# Patient Record
Sex: FEMALE | Race: WHITE | HISPANIC OR LATINO
[De-identification: names, ages, dates, MRNs, and addresses within clinical notes are randomized per-mention and may not be internally consistent; named-entity substitution may affect disease eponyms.]

---

## 2018-06-18 ENCOUNTER — TRANSCRIPTION ENCOUNTER (OUTPATIENT)
Age: 38
End: 2018-06-18

## 2022-04-18 ENCOUNTER — FORM ENCOUNTER (OUTPATIENT)
Age: 42
End: 2022-04-18

## 2022-05-18 ENCOUNTER — FORM ENCOUNTER (OUTPATIENT)
Age: 42
End: 2022-05-18

## 2022-06-08 ENCOUNTER — APPOINTMENT (OUTPATIENT)
Dept: ORTHOPEDIC SURGERY | Facility: CLINIC | Age: 42
End: 2022-06-08
Payer: OTHER MISCELLANEOUS

## 2022-06-08 VITALS — HEIGHT: 64 IN | BODY MASS INDEX: 23.9 KG/M2 | WEIGHT: 140 LBS

## 2022-06-08 PROCEDURE — 99072 ADDL SUPL MATRL&STAF TM PHE: CPT

## 2022-06-08 PROCEDURE — 20552 NJX 1/MLT TRIGGER POINT 1/2: CPT

## 2022-06-08 PROCEDURE — J3490M: CUSTOM

## 2022-06-08 PROCEDURE — 99203 OFFICE O/P NEW LOW 30 MIN: CPT | Mod: 25

## 2022-06-11 NOTE — HISTORY OF PRESENT ILLNESS
[Work related] : work related [Gradual] : gradual [Sudden] : sudden [6] : 6 [Dull/Aching] : dull/aching [Radiating] : radiating [Tightness] : tightness [Constant] : constant [Sleep] : sleep [Meds] : meds [Ice] : ice [Full time] : Work status: full time [de-identified] : Patient presents for initial encounter with neck pain. Patient was injured in 2008. Patient has had previous medical management with Dr. Chen and Dr. Borges. ACDF at C5-6. Patient states she is currently changes doctor for medical management. Patient is requesting to have prescriptions for Cymbalta, oxycodone. Patient states her worker's compensation case is closed and completed treatment with formal physical therapy. Patient continues with at home exercises/stretches as best tolerated. No changes in upper extremity strength. \par  Patient is also requesting trigger point injection which she usually receives every 3 months.\par \par Today in office as part of ongoing treatment a trigger point injection is administered into the right trapezius and paracervical muscles to facilitate ROM and stretching. Injection consisted of 1cc Kenalog 40 and 4cc .25% Marcaine. 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

## 2022-06-11 NOTE — DISCUSSION/SUMMARY
[Medication Risks Reviewed] : Medication risks reviewed [de-identified] : Discussed proper body mechanics and modified physical activity to avoid aggravation of symptoms. Patient will continue with at home exercises/stretches as best tolerated. Patient given referral for pain management. Advised patient of habit forming potential with analgesic drug use. Discussed treatment with trigger point injection. Patient given trigger point injection in office. Patient will call to have prescription medications renewed.

## 2022-06-11 NOTE — PHYSICAL EXAM
[Normal Coordination] : normal coordination [Normal DTR UE/LE] : normal DTR UE/LE  [Normal Sensation] : normal sensation [Normal Mood and Affect] : normal mood and affect [Orientated] : orientated [Able to Communicate] : able to communicate [Normal Skin] : normal skin [No Rash] : no rash [No Ulcers] : no ulcers [No Lesions] : no lesions [No obvious lymphadenopathy in areas examined] : no obvious lymphadenopathy in areas examined [Well Developed] : well developed [Well Nourished] : well nourished [Peripheral vascular exam is grossly normal] : peripheral vascular exam is grossly normal [No Respiratory Distress] : no respiratory distress [Lungs clear to auscultation bilaterally] : lungs clear to auscultation bilaterally [NL (45)] : right lateral flexion 45 degrees [NL (80)] : right lateral rotation 80 degrees [5___] : right grasp 5[unfilled]/5 [Biceps 2+] : biceps 2+ [Triceps 2+] : triceps 2+ [Brachioradialis 2+] : brachioradialis 2+ [] : no rhomboid tenderness

## 2022-07-12 ENCOUNTER — FORM ENCOUNTER (OUTPATIENT)
Age: 42
End: 2022-07-12

## 2022-08-11 ENCOUNTER — FORM ENCOUNTER (OUTPATIENT)
Age: 42
End: 2022-08-11

## 2022-08-31 ENCOUNTER — APPOINTMENT (OUTPATIENT)
Dept: ORTHOPEDIC SURGERY | Facility: CLINIC | Age: 42
End: 2022-08-31

## 2022-09-02 ENCOUNTER — NON-APPOINTMENT (OUTPATIENT)
Age: 42
End: 2022-09-02

## 2022-09-10 ENCOUNTER — APPOINTMENT (OUTPATIENT)
Dept: ORTHOPEDIC SURGERY | Facility: CLINIC | Age: 42
End: 2022-09-10

## 2022-09-10 VITALS — WEIGHT: 140 LBS | BODY MASS INDEX: 23.9 KG/M2 | HEIGHT: 64 IN

## 2022-09-10 PROCEDURE — 99072 ADDL SUPL MATRL&STAF TM PHE: CPT

## 2022-09-10 PROCEDURE — 99213 OFFICE O/P EST LOW 20 MIN: CPT | Mod: 25

## 2022-09-10 PROCEDURE — 20552 NJX 1/MLT TRIGGER POINT 1/2: CPT

## 2022-09-10 NOTE — DISCUSSION/SUMMARY
[de-identified] : Discussed medical mgmt, renewed prescriptions for Alprazolam, Duloxetine and oxycodone. Discussed home exercise program.\par Today in office as part of ongoing treatment a trigger point injection is administered into the left Rhomboid and Levator scapula muscles to facilitate ROM and stretching. Injection consisted of 4cc .25% Marcaine and 1cc Kenalog 40.

## 2022-09-10 NOTE — HISTORY OF PRESENT ILLNESS
[5] : 5 [Sharp] : sharp [Full time] : Work status: full time [de-identified] : Patient seen in follow up reports persistent pain in neck and upper extremities. Pain aggravated with increased physical activity. Pain relieved with medications which she has been taking long term. Patient employed in [employer] office. [de-identified] : N/A

## 2022-09-10 NOTE — PHYSICAL EXAM
[Normal Coordination] : normal coordination [Normal DTR UE/LE] : normal DTR UE/LE  [Normal Sensation] : normal sensation [Normal Mood and Affect] : normal mood and affect [Orientated] : orientated [Able to Communicate] : able to communicate [Normal Skin] : normal skin [No Rash] : no rash [No Ulcers] : no ulcers [No Lesions] : no lesions [No obvious lymphadenopathy in areas examined] : no obvious lymphadenopathy in areas examined [Well Developed] : well developed [Well Nourished] : well nourished [Peripheral vascular exam is grossly normal] : peripheral vascular exam is grossly normal [No Respiratory Distress] : no respiratory distress [Flexion] : flexion [Extension] : extension [] : light touch intact throughout both upper extremities

## 2022-09-20 ENCOUNTER — LABORATORY RESULT (OUTPATIENT)
Age: 42
End: 2022-09-20

## 2022-09-20 ENCOUNTER — APPOINTMENT (OUTPATIENT)
Dept: OBGYN | Facility: CLINIC | Age: 42
End: 2022-09-20

## 2022-09-20 VITALS
WEIGHT: 145 LBS | DIASTOLIC BLOOD PRESSURE: 62 MMHG | BODY MASS INDEX: 24.75 KG/M2 | HEIGHT: 64 IN | SYSTOLIC BLOOD PRESSURE: 102 MMHG

## 2022-09-20 DIAGNOSIS — Z87.448 PERSONAL HISTORY OF OTHER DISEASES OF URINARY SYSTEM: ICD-10-CM

## 2022-09-20 DIAGNOSIS — Z00.00 ENCOUNTER FOR GENERAL ADULT MEDICAL EXAMINATION W/OUT ABNORMAL FINDINGS: ICD-10-CM

## 2022-09-20 DIAGNOSIS — Z01.419 ENCOUNTER FOR GYNECOLOGICAL EXAMINATION (GENERAL) (ROUTINE) W/OUT ABNORMAL FINDINGS: ICD-10-CM

## 2022-09-20 DIAGNOSIS — M06.9 RHEUMATOID ARTHRITIS, UNSPECIFIED: ICD-10-CM

## 2022-09-20 LAB
BILIRUB UR QL STRIP: NORMAL
CLARITY UR: CLEAR
COLLECTION METHOD: NORMAL
GLUCOSE UR-MCNC: NORMAL
HCG UR QL: 1 EU/DL
HCG UR QL: NEGATIVE
HGB UR QL STRIP.AUTO: NORMAL
KETONES UR-MCNC: NORMAL
LEUKOCYTE ESTERASE UR QL STRIP: NORMAL
NITRITE UR QL STRIP: NORMAL
PH UR STRIP: 7
PROT UR STRIP-MCNC: 30
QUALITY CONTROL: YES
SP GR UR STRIP: 1.03

## 2022-09-20 PROCEDURE — 81003 URINALYSIS AUTO W/O SCOPE: CPT | Mod: QW

## 2022-09-20 PROCEDURE — 99203 OFFICE O/P NEW LOW 30 MIN: CPT | Mod: 25

## 2022-09-20 PROCEDURE — 99386 PREV VISIT NEW AGE 40-64: CPT

## 2022-09-20 PROCEDURE — 81025 URINE PREGNANCY TEST: CPT

## 2022-09-20 NOTE — PHYSICAL EXAM

## 2022-09-20 NOTE — HISTORY OF PRESENT ILLNESS
[Patient reported PAP Smear was normal] : Patient reported PAP Smear was normal [N] : Patient is not sexually active [Y] : Positive pregnancy history [Menarche Age: ____] : age at menarche was [unfilled] [Previously active] : previously active [FreeTextEntry1] : Deborah is a 42-year-old coming in for an annual exam. Also reports abnormal uterine bleeding that she uses norethindrone to stop. This started 3 months ago. She reports she had been bleeding for several weeks consecutively. She reports she was told she had a TVUS last year for abnormal bleeding and was told she had fibroids. She did not have any procedure at the time. \par LMP 8/19/2022, no bleeding since then. \par FMP age 16\par Last PAP 1 year ago normal, reports history of HPV 13 years ago. Follow-up was normal\par Sexually not active\par No history of STDs.\par G2, P1, CS x1, medical VTOP\par Mammogram -  in 2007. \par PMH -Lupus, RA. \par PSH -disc replacement, left hip replacement, CSX1\par Allergies -NKDA\par 8 cigarettes/day, EtOH–socially, drug use\par Family -heart disease, Maternal GM - thyroid cancer, mother - renal disease, heart disease - atrial myxoma, father - heart disease, prostate cancer, brother - atrial myxoma, brother - HTN, paternal aunt - CP, \par  [LMPDate] : 08/19/22 [MensesFreq] : 28 [MensesLength] : 7 [PGHxTotal] : 2 [[facility]] : 1 [PGHxABInduced] : 1

## 2022-09-20 NOTE — DISCUSSION/SUMMARY
[FreeTextEntry1] : Exam as above.\par Pap done.\par Discussed with the patient definition of abnormal uterine bleeding: abnormal quantity, duration, or schedule of bleeding.\par Discussed etiologies for AUB:\par 1. Structural uterine pathologies such as fibroids, polyps, adenomyosis vascular malformations, other uterine malformations, or neoplasia.\par 2. Non uterine causes such as ovulatory dysfunction, bleeding disorders, medications, cervical disorders.\par Discussed evaluation including:\par 1. Pregnancy test negative\par 2. CBC, ferritin, TIBC to evaluate for anemia was done last week per patient.  She will obtain results\par 3. Thyroid evaluation was done last week per patient, she will obtain results\par 4. Pelvic US to evaluate for fibroids, adenomyosis, polyps, uterine AV malformations, with possible Saline hysterography if needed\par 5. Endometrial sampling after sonogram evaluation - EMB or hysteroscopic EMC\par \par Referral for pelvic transvaginal US given. Patient will follow up for results and discussion on next steps.\par All questions were answered.\par Reviewed with the patient management options if the only finding is fibroids and EMB is normal.  Reviewed medical and interventional options.\par

## 2022-09-21 ENCOUNTER — NON-APPOINTMENT (OUTPATIENT)
Age: 42
End: 2022-09-21

## 2022-09-27 ENCOUNTER — NON-APPOINTMENT (OUTPATIENT)
Age: 42
End: 2022-09-27

## 2022-09-28 ENCOUNTER — RESULT REVIEW (OUTPATIENT)
Age: 42
End: 2022-09-28

## 2022-10-05 ENCOUNTER — APPOINTMENT (OUTPATIENT)
Dept: OBGYN | Facility: CLINIC | Age: 42
End: 2022-10-05

## 2022-10-05 DIAGNOSIS — D25.2 INTRAMURAL LEIOMYOMA OF UTERUS: ICD-10-CM

## 2022-10-05 DIAGNOSIS — D25.1 INTRAMURAL LEIOMYOMA OF UTERUS: ICD-10-CM

## 2022-10-05 DIAGNOSIS — D25.0 INTRAMURAL LEIOMYOMA OF UTERUS: ICD-10-CM

## 2022-10-05 LAB
CYTOLOGY CVX/VAG DOC THIN PREP: NORMAL
HPV HIGH+LOW RISK DNA PNL CVX: DETECTED

## 2022-10-05 PROCEDURE — 99214 OFFICE O/P EST MOD 30 MIN: CPT | Mod: 95

## 2022-10-06 ENCOUNTER — TRANSCRIPTION ENCOUNTER (OUTPATIENT)
Age: 42
End: 2022-10-06

## 2022-10-10 ENCOUNTER — FORM ENCOUNTER (OUTPATIENT)
Age: 42
End: 2022-10-10

## 2022-10-12 ENCOUNTER — APPOINTMENT (OUTPATIENT)
Dept: OBGYN | Facility: CLINIC | Age: 42
End: 2022-10-12

## 2022-10-12 RX ORDER — ALPRAZOLAM 0.5 MG/1
0.5 TABLET ORAL
Qty: 50 | Refills: 0 | Status: DISCONTINUED | COMMUNITY
Start: 2022-04-20 | End: 2022-10-12

## 2022-10-12 RX ORDER — ALPRAZOLAM 0.5 MG/1
0.5 TABLET ORAL TWICE DAILY
Qty: 60 | Refills: 0 | Status: DISCONTINUED | COMMUNITY
Start: 2022-07-14 | End: 2022-10-12

## 2022-10-12 RX ORDER — IBUPROFEN 800 MG/1
800 TABLET, FILM COATED ORAL EVERY 8 HOURS
Qty: 90 | Refills: 0 | Status: DISCONTINUED | COMMUNITY
Start: 2022-04-20 | End: 2022-10-12

## 2022-10-12 RX ORDER — OXYCODONE 10 MG/1
10 TABLET ORAL
Qty: 80 | Refills: 0 | Status: DISCONTINUED | COMMUNITY
Start: 2022-04-20 | End: 2022-10-12

## 2022-10-12 RX ORDER — ALPRAZOLAM 0.5 MG/1
0.5 TABLET ORAL TWICE DAILY
Qty: 60 | Refills: 2 | Status: DISCONTINUED | COMMUNITY
Start: 2022-09-10 | End: 2022-10-12

## 2022-10-12 RX ORDER — OXYCODONE 10 MG/1
10 TABLET ORAL EVERY 8 HOURS
Qty: 90 | Refills: 0 | Status: DISCONTINUED | COMMUNITY
Start: 2022-07-14 | End: 2022-10-12

## 2022-10-12 RX ORDER — DULOXETINE HYDROCHLORIDE 20 MG/1
20 CAPSULE, DELAYED RELEASE PELLETS ORAL TWICE DAILY
Qty: 60 | Refills: 0 | Status: DISCONTINUED | COMMUNITY
Start: 2022-04-20 | End: 2022-10-12

## 2022-10-12 RX ORDER — OXYCODONE 10 MG/1
10 TABLET ORAL 3 TIMES DAILY
Qty: 90 | Refills: 0 | Status: DISCONTINUED | COMMUNITY
Start: 2022-09-10 | End: 2022-10-12

## 2022-10-12 RX ORDER — DULOXETINE HYDROCHLORIDE 20 MG/1
20 CAPSULE, DELAYED RELEASE PELLETS ORAL TWICE DAILY
Qty: 50 | Refills: 0 | Status: DISCONTINUED | COMMUNITY
Start: 2022-04-20 | End: 2022-10-12

## 2022-10-12 RX ORDER — DULOXETINE HYDROCHLORIDE 20 MG/1
20 CAPSULE, DELAYED RELEASE PELLETS ORAL TWICE DAILY
Qty: 60 | Refills: 0 | Status: DISCONTINUED | COMMUNITY
Start: 2022-07-14 | End: 2022-10-12

## 2022-10-12 RX ORDER — OXYCODONE 10 MG/1
10 TABLET ORAL 3 TIMES DAILY
Qty: 90 | Refills: 0 | Status: DISCONTINUED | COMMUNITY
Start: 2022-07-15 | End: 2022-10-12

## 2022-10-12 RX ORDER — ALPRAZOLAM 0.5 MG/1
0.5 TABLET ORAL TWICE DAILY
Qty: 60 | Refills: 0 | Status: DISCONTINUED | COMMUNITY
Start: 2022-07-15 | End: 2022-10-12

## 2022-10-12 RX ORDER — OXYCODONE 10 MG/1
10 TABLET ORAL EVERY 8 HOURS
Qty: 90 | Refills: 0 | Status: DISCONTINUED | COMMUNITY
Start: 2022-04-20 | End: 2022-10-12

## 2022-10-12 RX ORDER — DULOXETINE HYDROCHLORIDE 20 MG/1
20 CAPSULE, DELAYED RELEASE PELLETS ORAL TWICE DAILY
Qty: 60 | Refills: 2 | Status: DISCONTINUED | COMMUNITY
Start: 2022-09-10 | End: 2022-10-12

## 2022-10-12 RX ORDER — ALPRAZOLAM 0.5 MG/1
0.5 TABLET ORAL TWICE DAILY
Qty: 60 | Refills: 0 | Status: DISCONTINUED | COMMUNITY
Start: 2022-08-15 | End: 2022-10-12

## 2022-10-12 RX ORDER — DULOXETINE HYDROCHLORIDE 20 MG/1
20 CAPSULE, DELAYED RELEASE PELLETS ORAL DAILY
Qty: 60 | Refills: 1 | Status: DISCONTINUED | COMMUNITY
Start: 2022-07-15 | End: 2022-10-12

## 2022-10-19 ENCOUNTER — APPOINTMENT (OUTPATIENT)
Dept: OBGYN | Facility: CLINIC | Age: 42
End: 2022-10-19

## 2022-10-19 VITALS
BODY MASS INDEX: 24.75 KG/M2 | WEIGHT: 145 LBS | DIASTOLIC BLOOD PRESSURE: 74 MMHG | HEIGHT: 64 IN | SYSTOLIC BLOOD PRESSURE: 116 MMHG

## 2022-10-19 LAB
HCG UR QL: NEGATIVE
QUALITY CONTROL: YES

## 2022-10-19 PROCEDURE — 99213 OFFICE O/P EST LOW 20 MIN: CPT | Mod: 25

## 2022-10-19 PROCEDURE — 81025 URINE PREGNANCY TEST: CPT

## 2022-10-19 PROCEDURE — 58558Z: CUSTOM

## 2022-10-19 NOTE — DISCUSSION/SUMMARY
[FreeTextEntry1] : Hysteroscopy with a submucosal fibroid with a significant intracavitary component.  EMB done.\par I reviewed with the patient this finding\par I discussed with her management options including:\par 1.  Observation.  The patient has abnormal bleeding and would likely not benefit from observation.\par 2.  Management with progesterone.  The patient has been on norethindrone in the past.\par 3.  Management with GnRH antagonist–Oriahnn.  Discussed that this will cause menopausal symptoms.  Discussed that there is limited of use for this medication is 2 years due to bone density effect.\par 4.  Endometrial ablation, the patient is not a good candidate due to distortion of cavity from the fibroid.  She could potentially have a hysteroscopic myomectomy with ablation after the fibroid is removed.\par 5.  Uterine artery embolization.\par 6.  Ultrasound fibroid ablation.\par 7.  Definitive procedure–hysterectomy.\par Discussed with the patient considering all these options while we are waiting for the biopsy results.\par The patient will follow-up in 2 weeks to review results and discuss her decision for management.

## 2022-10-19 NOTE — PHYSICAL EXAM
[Chaperone Present] : A chaperone was present in the examining room during all aspects of the physical examination [FreeTextEntry1] : Kareen toner [Appropriately responsive] : appropriately responsive [Alert] : alert [No Acute Distress] : no acute distress [Soft] : soft [Non-tender] : non-tender [Non-distended] : non-distended [Oriented x3] : oriented x3

## 2022-10-19 NOTE — PROCEDURE
[Hysteroscopy] : Hysteroscopy [Time out performed] : Pre-procedure time out performed.  Patient's name, date of birth and procedure confirmed. [Consent Obtained] : Consent obtained [Abnormal uterine bleeding] : abnormal uterine bleeding [Risks] : risks [Benefits] : benefits [Alternatives] : alternatives [Patient] : patient [Infection] : infection [Bleeding] : bleeding [Allergic Reaction] : allergic reaction [rigid] : Using aseptic technique a hysteroscopy was performed using a rigid hysteroscope [Sent to Pathology] : specimen was placed in buffered formalin and sent for pathology [Antibiotics given] : antibiotics not given [Hemostasis obtained] : hemostasis obtained [Tolerated Well] : Patient tolerated the procedure well [Aftercare instructions/regstrictions given and follow-up scheduled] : Aftercare instructions/restrictions given and follow-up scheduled [de-identified] : Endometrial cavity without polyps.  On the anterior uterine wall there is a submucosal fibroid, mostly within the cavity but with an intramural component that appears to be smaller than the cavitary component.  No other findings.  Ostia visualized bilaterally.

## 2022-10-19 NOTE — HISTORY OF PRESENT ILLNESS
[FreeTextEntry1] : Suni came in for office hysteroscopy and discuss management options of abnormal uterine bleeding.  She had a sonogram last year that showed fibroids, done for abnormal uterine bleeding.  This was supported with the most recent sonogram.  The most recent sonogram does show a submucosal fibroid measuring 1 x 1 x 1.3 cm.\par

## 2022-10-19 NOTE — DISCUSSION/SUMMARY
[FreeTextEntry1] : I reviewed the images of the ultrasound myself.  I agree with the assessment, submucosal fibroid possibly pedunculated within the cavity.\par I discussed with the patient the findings on the ultrasound.  Discussed the 3 types of submucosal fibroids including intracavitary, mostly cavitary but with an intramural component or mostly intramural with a smaller intracavitary component.  Discussed that the first 2 can be managed via operative hysteroscopy, but that the third type cannot be managed in this fashion.\par I discussed with her initial evaluation including:\par 1.  Performing SIS to evaluate the cavity and determine whether the fibroid is intracavitary.\par 2.  Office EMB\par 3.  Office hysteroscopy with EMB if the fibroid is mostly intramural.\par 4.  operating room hysteroscopy with D&C and if there is an intracavitary or mostly cavitary with small component of intramural fibroid, to remove it during the procedure.\par The patient understands that in the office, if the fibroid is mostly cavitary, or completely in the cavity, we would have to stop the procedure and schedule an operating room procedure.  Discussed that if this is a type of fibroid, once removed she may have resolution of symptoms.  Discussed that if the fibroid however is mostly intramural management options include:\par 1.  Medical management including progesterone compounds, progesterone IUD, GnRH antagonist with add back.\par 2.  Endometrial ablation–discussed that with fibroids that have a submucosal component there may be decreased benefit from this type of procedure, as the area of the fibroid distorts the cavity and may not be ablated properly.\par 3.  UFE–discussed embolization of the fibroids, the procedure and the possibility of expulsion of submucosal fibroid after the procedure.\par 4.  Ultrasound ablation of fibroids vaginally.\par 5.  Surgical procedure–hysterectomy.\par The patient is interested in moving forward with an office hysteroscopy with EMB, with the understanding that if the finding is an intracavitary or mostly intracavitary fibroid, she will likely need another procedure in the operating room.\par She will schedule an appointment for this.\par I also reviewed her Pap with her, she has normal Pap but HPV positive that is non 16/18.  She reports her last Pap smear was normal 1 year ago.  Discussed that the recommendations are for a 1 year follow-up with Pap as per ASCCP guidelines.\par All her questions were answered.\par Follow-up for office hysteroscopy

## 2022-10-19 NOTE — PROCEDURE
[Hysteroscopy] : Hysteroscopy [Time out performed] : Pre-procedure time out performed.  Patient's name, date of birth and procedure confirmed. [Consent Obtained] : Consent obtained [Abnormal uterine bleeding] : abnormal uterine bleeding [Risks] : risks [Benefits] : benefits [Alternatives] : alternatives [Patient] : patient [Infection] : infection [Bleeding] : bleeding [Allergic Reaction] : allergic reaction [rigid] : Using aseptic technique a hysteroscopy was performed using a rigid hysteroscope [Sent to Pathology] : specimen was placed in buffered formalin and sent for pathology [Antibiotics given] : antibiotics not given [Hemostasis obtained] : hemostasis obtained [Tolerated Well] : Patient tolerated the procedure well [Aftercare instructions/regstrictions given and follow-up scheduled] : Aftercare instructions/restrictions given and follow-up scheduled [de-identified] : Endometrial cavity without polyps.  On the anterior uterine wall there is a submucosal fibroid, mostly within the cavity but with an intramural component that appears to be smaller than the cavitary component.  No other findings.  Ostia visualized bilaterally.

## 2022-10-19 NOTE — HISTORY OF PRESENT ILLNESS
[FreeTextEntry1] : Suni has a telehealth appointment to review results of Pap and ultrasound and discuss management options.\par She has known fibroids and has had abnormal uterine bleeding.\par She had a sonogram done that shows an 8.5 x 4.2 x 6.2 centimeters uterus, submucosal fibroid measuring 1 x 1 x 1.3 cm, fundal fibroid measuring 2.8 x 2.6 x 1.5 cm, posterior intramural fibroid measuring 1.2 x 1 x 1.3 cm.  The right ovary could not be visualized, no adnexal masses seen.  The left ovary measured 4.2 x 3.2 x 3.4 cm with a dominant follicle measuring 2.8 x 2.5 x 2.8 cm.  Probable submucosal fibroid within the endometrial lining.\par Pap smear was normal, HPV positive non 16/18.\par

## 2022-10-28 ENCOUNTER — APPOINTMENT (OUTPATIENT)
Dept: OBGYN | Facility: CLINIC | Age: 42
End: 2022-10-28

## 2022-10-31 ENCOUNTER — APPOINTMENT (OUTPATIENT)
Dept: OBGYN | Facility: CLINIC | Age: 42
End: 2022-10-31

## 2022-10-31 DIAGNOSIS — Z71.2 PERSON CONSULTING FOR EXPLANATION OF EXAMINATION OR TEST FINDINGS: ICD-10-CM

## 2022-10-31 DIAGNOSIS — N93.9 ABNORMAL UTERINE AND VAGINAL BLEEDING, UNSPECIFIED: ICD-10-CM

## 2022-10-31 DIAGNOSIS — Z71.89 OTHER SPECIFIED COUNSELING: ICD-10-CM

## 2022-10-31 LAB — CORE LAB BIOPSY: NORMAL

## 2022-10-31 PROCEDURE — 99213 OFFICE O/P EST LOW 20 MIN: CPT | Mod: 95

## 2022-10-31 NOTE — HISTORY OF PRESENT ILLNESS
[Medical Office: ([facility])___] : at the medical office located in  [FreeTextEntry1] : Deborah is in for telehealth to review results of endometrial biopsy and discuss management options.\par She had a hysteroscopy with EMB in the office.  The hysteroscopy showed a submucosal fibroid, mostly in the cavity.  The EMB was benign.\par The patient reports she bled for 1 week at the beginning of the month, then had several days without bleeding, and then started bleeding again and is currently still bleeding.  She has norethindrone that was given to her recently to use when menstruating.\par The patient reports she is on iron as she was told she was anemic.  I have no current CBC but she reports she had a CBC done 3 months ago.

## 2022-10-31 NOTE — DISCUSSION/SUMMARY
[FreeTextEntry1] : I discussed with the patient the results of the EMB.\par I discussed with her the findings on the hysteroscopy again.\par I discussed with her management options for abnormal bleeding and presence of submucosal fibroid that is largely in the endometrial cavity:\par 1.  Progesterone only medications to decrease menstrual bleeding.  Discussed that this may help but it can also not decrease the bleeding sufficiently.\par 2.  GnRH antagonist–Oriahnn use.  Discussed with the patient that Oriahnn is a medication that will put her in medical menopause and she may be having menopausal symptoms due to it.  Discussed that the limit of use for this medication is 2 years due to bone effects.  Discussed that if she decides to use the medication and feels well with it, we can perform a bone density test and if it is adequate, extend the use of Oriahnn.\par 3.  Uterine artery embolization.  Discussed the procedure in detail with the patient.  Discussed that given the fact that the fibroid is mostly in the cavity it may be aborting after uterine artery embolization.\par 4.  Hysteroscopic myomectomy.  Discussed the procedure in detail.  Discussed that there may be a small area of the fibroid in the myometrium that will be left behind.\par 5.  Hysterectomy.  Discussed that this is a definitive management option, but it is the major surgery.\par The patient will consider all her options and let me know what she decided.\par Discussed initiation of norethindrone for the next 5 days, 5 mg/day.  Discussed that if the bleeding does not subside she should call the office to discuss this with me.  All her questions were answered.  CBC ordered to assess anemia.  She was instructed to continue taking her ferrous sulfate. patient

## 2022-11-10 ENCOUNTER — FORM ENCOUNTER (OUTPATIENT)
Age: 42
End: 2022-11-10

## 2022-12-09 ENCOUNTER — APPOINTMENT (OUTPATIENT)
Dept: ORTHOPEDIC SURGERY | Facility: CLINIC | Age: 42
End: 2022-12-09

## 2022-12-09 VITALS — BODY MASS INDEX: 25.61 KG/M2 | HEIGHT: 64 IN | WEIGHT: 150 LBS

## 2022-12-09 PROCEDURE — 99072 ADDL SUPL MATRL&STAF TM PHE: CPT

## 2022-12-09 PROCEDURE — 20552 NJX 1/MLT TRIGGER POINT 1/2: CPT

## 2022-12-09 PROCEDURE — 99213 OFFICE O/P EST LOW 20 MIN: CPT | Mod: 25

## 2022-12-11 ENCOUNTER — FORM ENCOUNTER (OUTPATIENT)
Age: 42
End: 2022-12-11

## 2022-12-12 NOTE — DISCUSSION/SUMMARY
[de-identified] : Discussed medical mgmt, renewed prescriptions for Alprazolam, Duloxetine and oxycodone. Discussed home exercise program.\par Today in office as part of ongoing treatment a trigger point injection is administered into the left Rhomboid and Levator scapula muscles to facilitate ROM and stretching. Injection consisted of 4cc .25% Marcaine and 1cc Kenalog 40.F/U 3 months. \par \par Prior to appointment and during encounter with patient extensive medical records were reviewed including but not limited to, hospital records, outpatient records, imaging results, and lab data.During this appointment the patient was examined, diagnoses were discussed and explained in a face to face manner. In addition extensive time was spent reviewing aforementioned diagnostic studies. Counseling including abnormal image results, differential diagnoses, treatment options, risk and benefits, lifestyle changes, current condition, and current medications was performed. Patient's comments, questions, and concerns were addressed and patient verbalized understanding. Based on this patient's presentation at our office, which is an orthopedic spine surgeon's office, this patient inherently / intrinsically has a risk, however minute, of developing issues such as Cauda equina syndrome, bowel and bladder changes, or progression of motor or neurological deficits such as paralysis which may be permanent.\par \par JANE CHAVEZ Acting as a Scribe for Dr. Jones I, Jane Chavez, attest that this documentation has been prepared under the direction and in the presence of Provider Daniel Enriquez MD.

## 2022-12-12 NOTE — HISTORY OF PRESENT ILLNESS
[6] : 6 [Part time] : Work status: part time [] : yes [de-identified] : 12/09/2022 - 43 y/o female presenting for a follow up of cervical. Symptoms remain the same since her last visit. Persistent neck pan and upper extremities. She has continued the same medication regimen for symptom control. \par \par 09/10/2022 - Patient seen in follow up reports persistent pain in neck and upper extremities. Pain aggravated with increased physical activity. Pain relieved with medications which she has been taking long term. Patient employed in [employer] office. [de-identified] : none [de-identified] : sandi

## 2023-01-10 ENCOUNTER — FORM ENCOUNTER (OUTPATIENT)
Age: 43
End: 2023-01-10

## 2023-02-20 ENCOUNTER — FORM ENCOUNTER (OUTPATIENT)
Age: 43
End: 2023-02-20

## 2023-03-10 ENCOUNTER — APPOINTMENT (OUTPATIENT)
Dept: ORTHOPEDIC SURGERY | Facility: CLINIC | Age: 43
End: 2023-03-10

## 2023-03-25 ENCOUNTER — APPOINTMENT (OUTPATIENT)
Dept: ORTHOPEDIC SURGERY | Facility: CLINIC | Age: 43
End: 2023-03-25
Payer: OTHER MISCELLANEOUS

## 2023-03-25 PROCEDURE — 99213 OFFICE O/P EST LOW 20 MIN: CPT

## 2023-03-25 RX ORDER — IBUPROFEN 800 MG/1
800 TABLET, FILM COATED ORAL 3 TIMES DAILY
Qty: 90 | Refills: 2 | Status: ACTIVE | COMMUNITY
Start: 2023-03-25 | End: 1900-01-01

## 2023-03-27 NOTE — HISTORY OF PRESENT ILLNESS
[Neck] : neck [4] : 4 [Full time] : Work status: full time [de-identified] : 3/25/23: Patient presenting for a follow up of C Spine. Recently started a new job as an  - tolerating it well. Pain has been controlled. Neck pan and upper extremities. She has continued the same medication regimen for symptom control. \par \par 12/09/2022 - 41 y/o female presenting for a follow up of cervical. Symptoms remain the same since her last visit. Persistent neck pan and upper extremities. She has continued the same medication regimen for symptom control. \par \par 09/10/2022 - Patient seen in follow up reports persistent pain in neck and upper extremities. Pain aggravated with increased physical activity. Pain relieved with medications which she has been taking long term. Patient employed in [employer] office.

## 2023-03-27 NOTE — DISCUSSION/SUMMARY
[de-identified] : Discussed medical mgmt, renewed prescriptions for Alprazolam, Duloxetine and oxycodone. Discussed home exercise program.\par Continue treatment with TPIs as needed. \par \par Prior to appointment and during encounter with patient extensive medical records were reviewed including but not limited to, hospital records, outpatient records, imaging results, and lab data.During this appointment the patient was examined, diagnoses were discussed and explained in a face to face manner. In addition extensive time was spent reviewing aforementioned diagnostic studies. Counseling including abnormal image results, differential diagnoses, treatment options, risk and benefits, lifestyle changes, current condition, and current medications was performed. Patient's comments, questions, and concerns were addressed and patient verbalized understanding. Based on this patient's presentation at our office, which is an orthopedic spine surgeon's office, this patient inherently / intrinsically has a risk, however minute, of developing issues such as Cauda equina syndrome, bowel and bladder changes, or progression of motor or neurological deficits such as paralysis which may be permanent.\par \par JANE CHAVEZ Acting as a Scribe for Dr. Enriquez\nan SPARKS, Jane Chavez, attest that this documentation has been prepared under the direction and in the presence of Provider Daniel Enriquez MD.

## 2023-04-02 ENCOUNTER — FORM ENCOUNTER (OUTPATIENT)
Age: 43
End: 2023-04-02

## 2023-04-11 ENCOUNTER — TRANSCRIPTION ENCOUNTER (OUTPATIENT)
Age: 43
End: 2023-04-11

## 2023-04-26 ENCOUNTER — FORM ENCOUNTER (OUTPATIENT)
Age: 43
End: 2023-04-26

## 2023-05-17 ENCOUNTER — FORM ENCOUNTER (OUTPATIENT)
Age: 43
End: 2023-05-17

## 2023-06-08 ENCOUNTER — FORM ENCOUNTER (OUTPATIENT)
Age: 43
End: 2023-06-08

## 2023-07-03 ENCOUNTER — APPOINTMENT (OUTPATIENT)
Dept: ORTHOPEDIC SURGERY | Facility: CLINIC | Age: 43
End: 2023-07-03

## 2023-07-17 ENCOUNTER — FORM ENCOUNTER (OUTPATIENT)
Age: 43
End: 2023-07-17

## 2023-07-19 ENCOUNTER — TRANSCRIPTION ENCOUNTER (OUTPATIENT)
Age: 43
End: 2023-07-19

## 2023-08-22 ENCOUNTER — TRANSCRIPTION ENCOUNTER (OUTPATIENT)
Age: 43
End: 2023-08-22

## 2023-08-23 ENCOUNTER — TRANSCRIPTION ENCOUNTER (OUTPATIENT)
Age: 43
End: 2023-08-23

## 2023-08-24 ENCOUNTER — TRANSCRIPTION ENCOUNTER (OUTPATIENT)
Age: 43
End: 2023-08-24

## 2023-08-25 ENCOUNTER — TRANSCRIPTION ENCOUNTER (OUTPATIENT)
Age: 43
End: 2023-08-25

## 2023-08-26 ENCOUNTER — APPOINTMENT (OUTPATIENT)
Dept: ORTHOPEDIC SURGERY | Facility: CLINIC | Age: 43
End: 2023-08-26
Payer: OTHER MISCELLANEOUS

## 2023-08-26 VITALS — BODY MASS INDEX: 25.61 KG/M2 | WEIGHT: 150 LBS | HEIGHT: 64 IN

## 2023-08-26 PROCEDURE — 99213 OFFICE O/P EST LOW 20 MIN: CPT | Mod: 25

## 2023-08-26 PROCEDURE — 20552 NJX 1/MLT TRIGGER POINT 1/2: CPT

## 2023-08-26 NOTE — HISTORY OF PRESENT ILLNESS
[Neck] : neck [4] : 4 [Full time] : Work status: full time [de-identified] : 08/26/2023 - Patient presenting for a FUV. Complaints of increased axial neck pain L > R x 3 wks. No trauma or mechanism injury. She has been completing routine stretching exercises which is overall helpful. TPIs in the past have provided relief. Low back pain is controlled. She has continued the same medication regimen for symptom control.   3/25/23: Patient presenting for a follow up of C Spine. Recently started a new job as an  - tolerating it well. Pain has been controlled. Neck pan and upper extremities. She has continued the same medication regimen for symptom control.   12/09/2022 - 43 y/o female presenting for a follow up of cervical. Symptoms remain the same since her last visit. Persistent neck pan and upper extremities. She has continued the same medication regimen for symptom control.   09/10/2022 - Patient seen in follow up reports persistent pain in neck and upper extremities. Pain aggravated with increased physical activity. Pain relieved with medications which she has been taking long term. Patient employed in [employer] office.

## 2023-08-26 NOTE — DISCUSSION/SUMMARY
[de-identified] : Discussed medical mgmt, renewed prescriptions for Alprazolam, Duloxetine and oxycodone. Discussed home exercise program. Continue treatment with TPIs as needed. Today in office as part of ongoing treatment a trigger point injection is administered into the left trapezius and paracervical muscles to facilitate ROM and stretching. Injection consisted of 4cc .25% Marcaine and 1cc Kenalog 40.F/U 3 months.   Prior to appointment and during encounter with patient extensive medical records were reviewed including but not limited to, hospital records, outpatient records, imaging results, and lab data.During this appointment the patient was examined, diagnoses were discussed and explained in a face to face manner. In addition extensive time was spent reviewing aforementioned diagnostic studies. Counseling including abnormal image results, differential diagnoses, treatment options, risk and benefits, lifestyle changes, current condition, and current medications was performed. Patient's comments, questions, and concerns were addressed and patient verbalized understanding. Based on this patient's presentation at our office, which is an orthopedic spine surgeon's office, this patient inherently / intrinsically has a risk, however minute, of developing issues such as Cauda equina syndrome, bowel and bladder changes, or progression of motor or neurological deficits such as paralysis which may be permanent.  JANE CHAVEZ Acting as a Scribe for Dr. Mina SPARKS, Jane Chavez, attest that this documentation has been prepared under the direction and in the presence of Provider Daniel Enriquez MD.

## 2023-08-28 ENCOUNTER — TRANSCRIPTION ENCOUNTER (OUTPATIENT)
Age: 43
End: 2023-08-28

## 2023-09-26 ENCOUNTER — TRANSCRIPTION ENCOUNTER (OUTPATIENT)
Age: 43
End: 2023-09-26

## 2023-09-27 ENCOUNTER — TRANSCRIPTION ENCOUNTER (OUTPATIENT)
Age: 43
End: 2023-09-27

## 2023-09-28 ENCOUNTER — TRANSCRIPTION ENCOUNTER (OUTPATIENT)
Age: 43
End: 2023-09-28

## 2023-11-01 ENCOUNTER — TRANSCRIPTION ENCOUNTER (OUTPATIENT)
Age: 43
End: 2023-11-01

## 2023-11-02 ENCOUNTER — TRANSCRIPTION ENCOUNTER (OUTPATIENT)
Age: 43
End: 2023-11-02

## 2023-12-08 ENCOUNTER — APPOINTMENT (OUTPATIENT)
Dept: ORTHOPEDIC SURGERY | Facility: CLINIC | Age: 43
End: 2023-12-08
Payer: OTHER MISCELLANEOUS

## 2023-12-08 VITALS — HEIGHT: 64 IN | BODY MASS INDEX: 25.61 KG/M2 | WEIGHT: 150 LBS

## 2023-12-08 DIAGNOSIS — M54.12 RADICULOPATHY, CERVICAL REGION: ICD-10-CM

## 2023-12-08 PROCEDURE — 99213 OFFICE O/P EST LOW 20 MIN: CPT

## 2024-02-07 ENCOUNTER — TRANSCRIPTION ENCOUNTER (OUTPATIENT)
Age: 44
End: 2024-02-07

## 2024-02-07 RX ORDER — DULOXETINE HYDROCHLORIDE 20 MG/1
20 CAPSULE, DELAYED RELEASE PELLETS ORAL TWICE DAILY
Qty: 60 | Refills: 0 | Status: DISCONTINUED | COMMUNITY
Start: 2023-03-25 | End: 2024-02-07

## 2024-02-07 RX ORDER — OXYCODONE 10 MG/1
10 TABLET ORAL EVERY 6 HOURS
Qty: 120 | Refills: 0 | Status: DISCONTINUED | COMMUNITY
Start: 2023-07-14 | End: 2024-02-07

## 2024-02-07 RX ORDER — OXYCODONE 10 MG/1
10 TABLET ORAL EVERY 6 HOURS
Qty: 120 | Refills: 0 | Status: ACTIVE | COMMUNITY
Start: 2023-03-25 | End: 1900-01-01

## 2024-02-07 RX ORDER — ALPRAZOLAM 0.5 MG/1
0.5 TABLET ORAL
Qty: 60 | Refills: 0 | Status: DISCONTINUED | COMMUNITY
Start: 2023-05-19 | End: 2024-02-07

## 2024-02-07 RX ORDER — OXYCODONE 10 MG/1
10 TABLET ORAL EVERY 6 HOURS
Qty: 120 | Refills: 0 | Status: DISCONTINUED | COMMUNITY
Start: 2023-09-26 | End: 2024-02-07

## 2024-02-07 RX ORDER — IBUPROFEN 800 MG/1
800 TABLET, FILM COATED ORAL EVERY 8 HOURS
Qty: 90 | Refills: 0 | Status: DISCONTINUED | COMMUNITY
Start: 2022-04-20 | End: 2024-02-07

## 2024-02-07 RX ORDER — DULOXETINE HYDROCHLORIDE 20 MG/1
20 CAPSULE, DELAYED RELEASE PELLETS ORAL TWICE DAILY
Qty: 60 | Refills: 2 | Status: DISCONTINUED | COMMUNITY
Start: 2023-08-26 | End: 2024-02-07

## 2024-02-07 RX ORDER — ALPRAZOLAM 0.5 MG/1
0.5 TABLET ORAL TWICE DAILY
Qty: 60 | Refills: 2 | Status: DISCONTINUED | COMMUNITY
Start: 2023-08-26 | End: 2024-02-07

## 2024-02-07 RX ORDER — DULOXETINE HYDROCHLORIDE 20 MG/1
20 CAPSULE, DELAYED RELEASE PELLETS ORAL
Qty: 60 | Refills: 0 | Status: ACTIVE | COMMUNITY
Start: 2022-08-15 | End: 1900-01-01

## 2024-02-07 RX ORDER — ALPRAZOLAM 0.5 MG/1
0.5 TABLET ORAL
Qty: 60 | Refills: 0 | Status: DISCONTINUED | COMMUNITY
Start: 2022-04-20 | End: 2024-02-07

## 2024-02-07 RX ORDER — ALPRAZOLAM 0.25 MG/1
0.25 TABLET ORAL
Qty: 30 | Refills: 0 | Status: ACTIVE | COMMUNITY
Start: 2023-12-08 | End: 1900-01-01

## 2024-02-07 RX ORDER — OXYCODONE 10 MG/1
10 TABLET ORAL EVERY 6 HOURS
Qty: 120 | Refills: 0 | Status: DISCONTINUED | COMMUNITY
Start: 2023-08-26 | End: 2024-02-07

## 2024-02-07 RX ORDER — OXYCODONE 10 MG/1
10 TABLET ORAL EVERY 6 HOURS
Qty: 120 | Refills: 0 | Status: DISCONTINUED | COMMUNITY
Start: 2022-08-15 | End: 2024-02-07

## 2024-02-07 RX ORDER — OXYCODONE 10 MG/1
10 TABLET ORAL EVERY 6 HOURS
Qty: 120 | Refills: 0 | Status: DISCONTINUED | COMMUNITY
Start: 2023-12-08 | End: 2024-02-07

## 2024-03-08 DIAGNOSIS — S39.012A STRAIN OF MUSCLE, FASCIA AND TENDON OF LOWER BACK, INITIAL ENCOUNTER: ICD-10-CM

## 2024-03-08 DIAGNOSIS — S16.1XXA STRAIN OF MUSCLE, FASCIA AND TENDON AT NECK LEVEL, INITIAL ENCOUNTER: ICD-10-CM

## 2024-03-08 RX ORDER — ALPRAZOLAM 0.25 MG/1
0.25 TABLET ORAL
Qty: 30 | Refills: 0 | Status: ACTIVE | COMMUNITY
Start: 2024-03-08 | End: 1900-01-01

## 2024-03-08 RX ORDER — DULOXETINE HYDROCHLORIDE 20 MG/1
20 CAPSULE, DELAYED RELEASE PELLETS ORAL TWICE DAILY
Qty: 60 | Refills: 2 | Status: ACTIVE | COMMUNITY
Start: 2024-03-08 | End: 1900-01-01

## 2024-03-08 RX ORDER — OXYCODONE 10 MG/1
10 TABLET ORAL 4 TIMES DAILY
Qty: 120 | Refills: 0 | Status: ACTIVE | COMMUNITY
Start: 2024-03-08 | End: 1900-01-01

## 2024-03-11 ENCOUNTER — APPOINTMENT (OUTPATIENT)
Dept: ORTHOPEDIC SURGERY | Facility: CLINIC | Age: 44
End: 2024-03-11
Payer: OTHER MISCELLANEOUS

## 2024-03-11 VITALS — HEIGHT: 64 IN | WEIGHT: 150 LBS | BODY MASS INDEX: 25.61 KG/M2

## 2024-03-11 PROCEDURE — 99213 OFFICE O/P EST LOW 20 MIN: CPT | Mod: 25

## 2024-03-11 PROCEDURE — 20552 NJX 1/MLT TRIGGER POINT 1/2: CPT

## 2024-03-11 NOTE — PHYSICAL EXAM
[Normal DTR UE/LE] : normal DTR UE/LE  [Normal Coordination] : normal coordination [Normal Mood and Affect] : normal mood and affect [Normal Sensation] : normal sensation [Oriented] : oriented [Able to Communicate] : able to communicate [No Rash] : no rash [Normal Skin] : normal skin [No Lesions] : no lesions [No Ulcers] : no ulcers [No obvious lymphadenopathy in areas examined] : no obvious lymphadenopathy in areas examined [Well Developed] : well developed [Well Nourished] : well nourished [Peripheral vascular exam is grossly normal] : peripheral vascular exam is grossly normal [No Respiratory Distress] : no respiratory distress [Extension] : extension [Flexion] : flexion [] : trapezial tenderness

## 2024-03-14 NOTE — DISCUSSION/SUMMARY
[de-identified] : Discussed medical mgmt, renewed prescription narcotic and muscle relaxer. . Discussed home exercise program. Continue treatment with TPIs as needed. F/U 3 months.   Today in office as part of ongoing treatment a trigger point injection is administered into right trapezius and paracervical musculature to facilitate ROM and stretching. Injection consisted of 1cc Kenalog 40 and 4cc .25% Marcaine.   Prior to appointment and during encounter with patient extensive medical records were reviewed including but not limited to, hospital records, outpatient records, imaging results, and lab data.During this appointment the patient was examined, diagnoses were discussed and explained in a face to face manner. In addition extensive time was spent reviewing aforementioned diagnostic studies. Counseling including abnormal image results, differential diagnoses, treatment options, risk and benefits, lifestyle changes, current condition, and current medications was performed. Patient's comments, questions, and concerns were addressed and patient verbalized understanding. Based on this patient's presentation at our office, which is an orthopedic spine surgeon's office, this patient inherently / intrinsically has a risk, however minute, of developing issues such as Cauda equina syndrome, bowel and bladder changes, or progression of motor or neurological deficits such as paralysis which may be permanent.  JANE CHAVEZ Acting as a Scribe for Dr. Mina SPARKS, Jane Chavez, attest that this documentation has been prepared under the direction and in the presence of Provider Daniel Enriquez MD.

## 2024-03-14 NOTE — HISTORY OF PRESENT ILLNESS
[5] : 5 [Full time] : Work status: full time [] : yes [de-identified] : 03/11/2024 - Patient presenting for FUV of axial neck and low back pain. Complaints of predominately right trap pain. She still is considering RT ALYSSA. No major changes since she was last seen. She has continued the same medication regimen for symptom control. TPI at last visit was helpful and she wishes to proceed today.   12/08/2023 - The patient is here for a follow-up appointment. She is contemplating undergoing a right total hip arthroplasty (ALYSSA) as her gait has been affected by hip pain, leading to an increase in back pain. She has continued the same medication regimen for symptom control.   08/26/2023 - Patient presenting for a FUV. Complaints of increased axial neck pain L > R x 3 wks. No trauma or mechanism injury. She has been completing routine stretching exercises which is overall helpful. TPIs in the past have provided relief. Low back pain is controlled. She has continued the same medication regimen for symptom control.   3/25/23: Patient presenting for a follow up of [facility]. Recently started a new job as an  - tolerating it well. Pain has been controlled. Neck pan and upper extremities. She has continued the same medication regimen for symptom control.   12/09/2022 - 41 y/o female presenting for a follow up of cervical. Symptoms remain the same since her last visit. Persistent neck pan and upper extremities. She has continued the same medication regimen for symptom control.   09/10/2022 - Patient seen in follow up reports persistent pain in neck and upper extremities. Pain aggravated with increased physical activity. Pain relieved with medications which she has been taking long term. Patient employed in [employer] office. [de-identified] : no treatment

## 2024-04-08 RX ORDER — ALPRAZOLAM 0.25 MG/1
0.25 TABLET ORAL TWICE DAILY
Qty: 60 | Refills: 1 | Status: ACTIVE | COMMUNITY
Start: 2024-04-08 | End: 1900-01-01

## 2024-04-08 RX ORDER — DULOXETINE HYDROCHLORIDE 20 MG/1
20 CAPSULE, DELAYED RELEASE PELLETS ORAL TWICE DAILY
Qty: 60 | Refills: 2 | Status: ACTIVE | COMMUNITY
Start: 2024-04-08 | End: 1900-01-01

## 2024-04-08 RX ORDER — OXYCODONE 10 MG/1
10 TABLET ORAL EVERY 6 HOURS
Qty: 120 | Refills: 0 | Status: ACTIVE | COMMUNITY
Start: 2024-04-08 | End: 1900-01-01

## 2024-05-08 ENCOUNTER — TRANSCRIPTION ENCOUNTER (OUTPATIENT)
Age: 44
End: 2024-05-08

## 2024-05-08 RX ORDER — ALPRAZOLAM 0.25 MG/1
0.25 TABLET ORAL TWICE DAILY
Qty: 60 | Refills: 1 | Status: ACTIVE | COMMUNITY
Start: 2024-05-08 | End: 1900-01-01

## 2024-05-08 RX ORDER — OXYCODONE 10 MG/1
10 TABLET ORAL 4 TIMES DAILY
Qty: 120 | Refills: 0 | Status: ACTIVE | COMMUNITY
Start: 2024-05-08 | End: 1900-01-01

## 2024-05-21 ENCOUNTER — NON-APPOINTMENT (OUTPATIENT)
Age: 44
End: 2024-05-21

## 2024-06-03 ENCOUNTER — APPOINTMENT (OUTPATIENT)
Dept: ORTHOPEDIC SURGERY | Facility: CLINIC | Age: 44
End: 2024-06-03
Payer: OTHER MISCELLANEOUS

## 2024-06-03 VITALS — WEIGHT: 160 LBS | BODY MASS INDEX: 27.31 KG/M2 | HEIGHT: 64 IN

## 2024-06-03 DIAGNOSIS — M50.10 CERVICAL DISC DISORDER WITH RADICULOPATHY, UNSPECIFIED CERVICAL REGION: ICD-10-CM

## 2024-06-03 DIAGNOSIS — M62.838 OTHER MUSCLE SPASM: ICD-10-CM

## 2024-06-03 DIAGNOSIS — M46.00 SPINAL ENTHESOPATHY, SITE UNSPECIFIED: ICD-10-CM

## 2024-06-03 PROCEDURE — 20552 NJX 1/MLT TRIGGER POINT 1/2: CPT

## 2024-06-03 PROCEDURE — 99213 OFFICE O/P EST LOW 20 MIN: CPT | Mod: 25

## 2024-06-03 RX ORDER — ALPRAZOLAM 0.25 MG/1
0.25 TABLET ORAL TWICE DAILY
Qty: 60 | Refills: 1 | Status: ACTIVE | COMMUNITY
Start: 2024-06-03 | End: 1900-01-01

## 2024-06-03 RX ORDER — OXYCODONE 10 MG/1
10 TABLET ORAL 4 TIMES DAILY
Qty: 120 | Refills: 0 | Status: ACTIVE | COMMUNITY
Start: 2024-06-03 | End: 1900-01-01

## 2024-06-03 RX ORDER — DULOXETINE HYDROCHLORIDE 20 MG/1
20 CAPSULE, DELAYED RELEASE PELLETS ORAL TWICE DAILY
Qty: 60 | Refills: 2 | Status: ACTIVE | COMMUNITY
Start: 2024-06-03 | End: 1900-01-01

## 2024-06-03 NOTE — PHYSICAL EXAM
[Normal Coordination] : normal coordination [Normal DTR UE/LE] : normal DTR UE/LE  [Normal Sensation] : normal sensation [Normal Mood and Affect] : normal mood and affect [Oriented] : oriented [Able to Communicate] : able to communicate [Normal Skin] : normal skin [No Rash] : no rash [No Ulcers] : no ulcers [No Lesions] : no lesions [No obvious lymphadenopathy in areas examined] : no obvious lymphadenopathy in areas examined [Well Developed] : well developed [Well Nourished] : well nourished [Peripheral vascular exam is grossly normal] : peripheral vascular exam is grossly normal [No Respiratory Distress] : no respiratory distress [Flexion] : flexion [Extension] : extension [] : light touch intact throughout both upper extremities

## 2024-06-04 PROBLEM — M46.00 SPINAL ENTHESOPATHY: Status: ACTIVE | Noted: 2022-06-11

## 2024-06-04 PROBLEM — M50.10 DISPLACEMENT OF CERVICAL INTERVERTEBRAL DISC WITH RADICULOPATHY: Status: ACTIVE | Noted: 2022-06-11

## 2024-06-04 PROBLEM — M62.838 TRAPEZIUS MUSCLE SPASM: Status: ACTIVE | Noted: 2023-08-26

## 2024-06-04 NOTE — HISTORY OF PRESENT ILLNESS
[7] : 7 [Full time] : Work status: full time [] : yes [de-identified] : 06/03/2024 - Patient presenting for f/u neck pain. Reports increased physical demand while moving made her stiffness worse. Reports her right hip OA diagnosis. Continues home stretching exercises. Periodic TPIs are helpful. Percocet as prescribed helpful for symptom control.   03/11/2024 - Patient presenting for FUV of axial neck and low back pain. Complaints of predominately right trap pain. She still is considering RT ALYSSA. No major changes since she was last seen. She has continued the same medication regimen for symptom control. TPI at last visit was helpful and she wishes to proceed today.   12/08/2023 - The patient is here for a follow-up appointment. She is contemplating undergoing a right total hip arthroplasty (ALYSSA) as her gait has been affected by hip pain, leading to an increase in back pain. She has continued the same medication regimen for symptom control.   08/26/2023 - Patient presenting for a FUV. Complaints of increased axial neck pain L > R x 3 wks. No trauma or mechanism injury. She has been completing routine stretching exercises which is overall helpful. TPIs in the past have provided relief. Low back pain is controlled. She has continued the same medication regimen for symptom control.   3/25/23: Patient presenting for a follow up of [facility]. Recently started a new job as an  - tolerating it well. Pain has been controlled. Neck pan and upper extremities. She has continued the same medication regimen for symptom control.   12/09/2022 - 43 y/o female presenting for a follow up of cervical. Symptoms remain the same since her last visit. Persistent neck pan and upper extremities. She has continued the same medication regimen for symptom control.   09/10/2022 - Patient seen in follow up reports persistent pain in neck and upper extremities. Pain aggravated with increased physical activity. Pain relieved with medications which she has been taking long term. Patient employed in [employer] office. [de-identified] : no treatment

## 2024-06-04 NOTE — DISCUSSION/SUMMARY
[de-identified] : Discussed medical mgmt, renewed prescription narcotic. Discussed home exercise program. Continue treatment with TPIs as needed. F/U 3 months.   Today in office as part of ongoing treatment a trigger point injection (X1) is administered into cervical/trapezius paraspinal musculature to facilitate ROM and stretching. Each Injection consisted of 1cc Kenalog 40 and 4cc .25% Marcaine.   Prior to appointment and during encounter with patient extensive medical records were reviewed including but not limited to, hospital records, outpatient records, imaging results, and lab data.During this appointment the patient was examined, diagnoses were discussed and explained in a face to face manner. In addition extensive time was spent reviewing aforementioned diagnostic studies. Counseling including abnormal image results, differential diagnoses, treatment options, risk and benefits, lifestyle changes, current condition, and current medications was performed. Patient's comments, questions, and concerns were addressed and patient verbalized understanding. Based on this patient's presentation at our office, which is an orthopedic spine surgeon's office, this patient inherently / intrinsically has a risk, however minute, of developing issues such as Cauda equina syndrome, bowel and bladder changes, or progression of motor or neurological deficits such as paralysis which may be permanent.  JANE CHAVEZ Acting as a Scribe for Dr. Mina SPARKS, Jane Chavez, attest that this documentation has been prepared under the direction and in the presence of Provider Daniel Enriquez MD.

## 2024-06-19 RX ORDER — DULOXETINE HYDROCHLORIDE 20 MG/1
20 CAPSULE, DELAYED RELEASE PELLETS ORAL TWICE DAILY
Qty: 30 | Refills: 0 | Status: ACTIVE | COMMUNITY
Start: 2024-06-19 | End: 1900-01-01

## 2024-06-19 RX ORDER — ALPRAZOLAM 0.25 MG/1
0.25 TABLET ORAL AT BEDTIME
Qty: 15 | Refills: 0 | Status: ACTIVE | COMMUNITY
Start: 2024-06-19 | End: 1900-01-01

## 2024-06-19 RX ORDER — OXYCODONE 10 MG/1
10 TABLET ORAL EVERY 6 HOURS
Qty: 60 | Refills: 0 | Status: ACTIVE | COMMUNITY
Start: 2024-06-19 | End: 1900-01-01

## 2024-06-21 RX ORDER — OXYCODONE 10 MG/1
10 TABLET ORAL EVERY 6 HOURS
Qty: 60 | Refills: 0 | Status: ACTIVE | COMMUNITY
Start: 2024-06-21 | End: 1900-01-01

## 2024-06-21 RX ORDER — ALPRAZOLAM 0.25 MG/1
0.25 TABLET ORAL
Qty: 15 | Refills: 0 | Status: ACTIVE | COMMUNITY
Start: 2024-06-21 | End: 1900-01-01

## 2024-06-22 RX ORDER — OXYCODONE 10 MG/1
10 TABLET ORAL EVERY 6 HOURS
Qty: 60 | Refills: 0 | Status: ACTIVE | COMMUNITY
Start: 2024-06-22 | End: 1900-01-01

## 2024-06-25 ENCOUNTER — NON-APPOINTMENT (OUTPATIENT)
Age: 44
End: 2024-06-25

## 2024-07-08 ENCOUNTER — TRANSCRIPTION ENCOUNTER (OUTPATIENT)
Age: 44
End: 2024-07-08

## 2024-07-09 ENCOUNTER — TRANSCRIPTION ENCOUNTER (OUTPATIENT)
Age: 44
End: 2024-07-09

## 2024-07-10 RX ORDER — DULOXETINE HYDROCHLORIDE 20 MG/1
20 CAPSULE, DELAYED RELEASE PELLETS ORAL TWICE DAILY
Qty: 60 | Refills: 2 | Status: ACTIVE | COMMUNITY
Start: 2024-07-10 | End: 1900-01-01

## 2024-07-11 RX ORDER — OXYCODONE 5 MG/1
5 TABLET ORAL
Qty: 64 | Refills: 0 | Status: ACTIVE | COMMUNITY
Start: 2024-07-10 | End: 1900-01-01

## 2024-07-11 RX ORDER — ALPRAZOLAM 0.25 MG/1
0.25 TABLET ORAL AT BEDTIME
Qty: 30 | Refills: 1 | Status: DISCONTINUED | COMMUNITY
Start: 2024-07-10 | End: 2024-07-11

## 2024-07-12 RX ORDER — OXYCODONE 10 MG/1
10 TABLET ORAL 4 TIMES DAILY
Qty: 120 | Refills: 0 | Status: ACTIVE | COMMUNITY
Start: 2024-07-12 | End: 1900-01-01

## 2024-07-12 RX ORDER — ALPRAZOLAM 0.25 MG/1
0.25 TABLET ORAL
Qty: 30 | Refills: 1 | Status: ACTIVE | COMMUNITY
Start: 2024-07-12 | End: 1900-01-01

## 2024-07-12 RX ORDER — IBUPROFEN 800 MG/1
800 TABLET, FILM COATED ORAL 3 TIMES DAILY
Qty: 90 | Refills: 1 | Status: ACTIVE | COMMUNITY
Start: 2024-07-12 | End: 1900-01-01

## 2024-07-12 RX ORDER — DULOXETINE HYDROCHLORIDE 20 MG/1
20 CAPSULE, DELAYED RELEASE PELLETS ORAL TWICE DAILY
Qty: 60 | Refills: 2 | Status: ACTIVE | COMMUNITY
Start: 2024-07-12 | End: 1900-01-01